# Patient Record
(demographics unavailable — no encounter records)

---

## 2025-01-18 NOTE — DISCUSSION/SUMMARY
[School] : school [Development and Mental Health] : development and mental health [Nutrition and Physical Activity] : nutrition and physical activity [Oral Health] : oral health [Safety] : safety [No Medications] : ~He/She~ is not on any medications [Mother] : mother [Full Activity without restrictions including Physical Education & Athletics] : Full Activity without restrictions including Physical Education & Athletics [I have examined the above-named student and completed the preparticipation physical evaluation. The athlete does not present apparent clinical contraindications to practice and participate in sport(s) as outlined above. A copy of the physical exam is on r] : I have examined the above-named student and completed the preparticipation physical evaluation. The athlete does not present apparent clinical contraindications to practice and participate in sport(s) as outlined above. A copy of the physical exam is on record in my office and can be made available to the school at the request of the parents. If conditions arise after the athlete has been cleared for participation, the physician may rescind the clearance until the problem is resolved and the potential consequences are completely explained to the athlete (and parents/guardians). [FreeTextEntry1] :  Assessment and Plan:   - **Frequent Nose Bleeds:** The patient has been having frequent nosebleeds historically, however, this seems to be improving. Plan to manage with AYR saline gel, and if situation doesn't improve, may possibly need to visit ENT for cauterization.     - Prescribing AYR Saline Gel      - Possible follow-up with ENT if condition persists    - **Severe Eczema:** Mimi's severe eczema is being effectively managed with the help of Dupixent.     - Continuation of Dupixent for managing severe eczema    - **Mild Asthma:** The patient has mild asthma which doesn't seem to cause much issue.     - Monitor the condition      - Use inhalers if necessary    - **Immunizations:** The patient received her flu vaccine and is up to date on other vaccines. Tdap and blood work will be required next year.     - Scheduling for Tdap and blood work for next year    Continue balanced diet with all food groups. Brush teeth twice a day with toothbrush. Recommend visit to dentist. Help child to maintain consistent daily routines and sleep schedule. School discussed. Ensure home is safe. Teach child about personal safety. Use consistent, positive discipline. Limit screen time to no more than 2 hours per day. Encourage physical activity. Child needs to ride in a belt-positioning booster seat until  4 feet 9 inches has been reached and are between 8 and 12 years of age.   Return 1 year for routine well child check.

## 2025-01-18 NOTE — HISTORY OF PRESENT ILLNESS
[Mother] : mother [Eggs] : eggs [Eats meals with family] : eats meals with family [Normal] : Normal [Brushing teeth twice/d] : brushing teeth twice per day [Yes] : Patient goes to dentist yearly [Toothpaste] : Primary Fluoride Source: Toothpaste [Playtime (60 min/d)] : playtime 60 min a day [Participates in after-school activities] : participates in after-school activities [< 2 hrs of screen time per day] : less than 2 hrs of screen time per day [Has Friends] : has friends [Grade ___] : Grade [unfilled] [No difficulties with Homework] : no difficulties with homework [No] : No cigarette smoke exposure [Appropriately restrained in motor vehicle] : appropriately restrained in motor vehicle [Supervised outdoor play] : supervised outdoor play [Supervised around water] : supervised around water [Wears helmet and pads] : wears helmet and pads [Parent knows child's friends] : parent knows child's friends [Parent discusses safety rules regarding adults] : parent discusses safety rules regarding adults [Family discusses home emergency plan] : family discusses home emergency plan [Monitored computer use] : monitored computer use [Exposure to alcohol] : no exposure to alcohol [Exposure to tobacco] : no exposure to tobacco [Exposure to electronic nicotine delivery system] : No exposure to electronic nicotine delivery system [Exposure to illicit drugs] : no exposure to illicit drugs [FreeTextEntry1] : Mimi has a history of severe eczema which has been well controlled with a medication, dupixent, and she has mild asthma. She has had repeated bloody noses, which have been happening daily. However, the frequency of nosebleeds is decreasing.

## 2025-04-18 NOTE — CONSULT LETTER
[Dear  ___] : Dear  [unfilled], [Courtesy Letter:] : I had the pleasure of seeing your patient, [unfilled], in my office today. [Please see my note below.] : Please see my note below. [Consult Closing:] : Thank you very much for allowing me to participate in the care of this patient.  If you have any questions, please do not hesitate to contact me. [Sincerely,] : Sincerely, [FreeTextEntry3] : Brit Avelar NP-BC Certified Family Nurse Practitioner Pediatric Neurology [facility]

## 2025-04-18 NOTE — PHYSICAL EXAM
[Well-appearing] : well-appearing [Normocephalic] : normocephalic [No dysmorphic facial features] : no dysmorphic facial features [No ocular abnormalities] : no ocular abnormalities [Neck supple] : neck supple [No deformities] : no deformities [Alert] : alert [Well related, good eye contact] : well related, good eye contact [Conversant] : conversant [Normal speech and language] : normal speech and language [Follows instructions well] : follows instructions well [No facial asymmetry or weakness] : no facial asymmetry or weakness [Gets up on table without difficulty] : gets up on table without difficulty [Good walking balance] : good walking balance [Normal gait] : normal gait

## 2025-04-18 NOTE — ASSESSMENT
[FreeTextEntry1] : Mimi is 10 y/o girl seen today for an initial visit for inattention and behavioral concerns of anxiety and OCD currently on Zoloft.  At home she is fidgety, unable to follow multi-step instructions and unable to control emotions. At school, teachers have not reported any symptoms. [place] forms given for parent and teacher to complete. ADHD evaluation is ongoing,

## 2025-04-18 NOTE — PLAN
[FreeTextEntry1] : - [place] questionnaires given to mother for parent and teacher - refer to in office psych NP for concerns of anxiety, OCD and depression  -  Discussed use of Omega 3 fish oil - Discussed use of medications as well as side effects if accommodations do not improve school performance - Follow up 1 month to review [place] questionnaires

## 2025-04-18 NOTE — CONSULT LETTER
[Dear  ___] : Dear  [unfilled], [Courtesy Letter:] : I had the pleasure of seeing your patient, [unfilled], in my office today. [Please see my note below.] : Please see my note below. [Consult Closing:] : Thank you very much for allowing me to participate in the care of this patient.  If you have any questions, please do not hesitate to contact me. [Sincerely,] : Sincerely, [FreeTextEntry3] : Brit Avelar NP-BC Certified Family Nurse Practitioner Pediatric Neurology [facility]

## 2025-04-18 NOTE — QUALITY MEASURES
[Impairment in more than one setting] : Impairment in more than one setting: Yes [Coexisting conditions] : Coexisting conditions: Yes [Medication choices] : Medication choices: Not Applicable [Side effects of medications] : Side effects of medications: Not Applicable [FreeTextEntry1] : [place] forms given for parent and teacher to complete

## 2025-04-18 NOTE — QUALITY MEASURES
[Impairment in more than one setting] : Impairment in more than one setting: Yes [Coexisting conditions] : Coexisting conditions: Yes [Medication choices] : Medication choices: Not Applicable [Side effects of medications] : Side effects of medications: Not Applicable [FreeTextEntry1] : [place] forms given for parent and teacher to complete

## 2025-04-18 NOTE — PLAN
[FreeTextEntry1] : - [place] questionnaires given to mother for parent and teacher - refer to in office psych NP for concerns of anxiety, OCD and depression  -  Discussed use of Omega 3 fish oil - Discussed use of medications as well as side effects if accommodations do not improve school performance - Follow up 1 month to review [place] questionnaires

## 2025-04-18 NOTE — HISTORY OF PRESENT ILLNESS
[FreeTextEntry1] : Mimi is a 8 y/o girl seen today for an initial visit for inattention and behavioral concerns. Hx of anxiety and OCD currently on zoloft. At home, mom noticed that she was extra fidgety, moving her legs and just unable to stay still. She is unable to sit through a meal without getting up. Parents stated that she noticed that since 2023.Mom thought it was a new school and best friend moving away. She states that she always bored and need to activity doing something. She is seeing therapist for anxiety and OCD once a week virtually. Pt. has a hard time regulating her emotions, she has a lot of tantrums. Parents don't think it is age appropriate and thinks she is craving attention. She is unable to follow multi-step instructions without reminders and patient reports need one step instructions. She is unable to follow routine things like brushing teeth in the morning. She is unable to get homework done in a timely manner, gets frustrated easily. She is unorganized.    At school, teachers haven't mentioned anything. she does well academically.     Educational assessment: Inattention and behavioral concerns  Current Grade: 4th grade [place]: [place] [address] [place] [address]  General ED/Any Accommodations/ICT in place: In a regular class with 22 students to one teacher at a time. No school accommodations    Hyperactivity: extremely fidgety   Impulsivity:  Can be aggressive with parents when she gets upset. Interrupts when others are speaking, has a hard time waiting   Social Concerns: Denies any social concerns  Sleep: sleeps well, goes to sleep at 830p and wakes up 645am- 7am sleeps the night, gives Melatonin 0.5mg Eating: eats a varied diet Play: gymnastics she is able to focus and does an art class    Family hx of developmental delays/ADD/ADHD: Denies  Other coexisting behaviors? -Mood disorder/depression: Denies  -Anxiety: Mother      Co- morbidities: Diagnosed with anxiety, OCD, no concerns of depression   Other health concerns: Denies staring, twitching, seizure or seizure-like activity. No serious head injury, meningoencephalitis.

## 2025-04-18 NOTE — REASON FOR VISIT
[Initial Consultation] : an initial consultation for [Mother] : mother [Patient] : patient [Parents] : parents

## 2025-04-18 NOTE — HISTORY OF PRESENT ILLNESS
[FreeTextEntry1] : Mimi is a 8 y/o girl seen today for an initial visit for inattention and behavioral concerns. Hx of anxiety and OCD currently on zoloft. At home, mom noticed that she was extra fidgety, moving her legs and just unable to stay still. She is unable to sit through a meal without getting up. Parents stated that she noticed that since 2023.Mom thought it was a new school and best friend moving away. She states that she always bored and need to activity doing something. She is seeing therapist for anxiety and OCD once a week virtually. Pt. has a hard time regulating her emotions, she has a lot of tantrums. Parents don't think it is age appropriate and thinks she is craving attention. She is unable to follow multi-step instructions without reminders and patient reports need one step instructions. She is unable to follow routine things like brushing teeth in the morning. She is unable to get homework done in a timely manner, gets frustrated easily. She is unorganized.    At school, teachers haven't mentioned anything. she does well academically.     Educational assessment: Inattention and behavioral concerns  Current Grade: 4th grade Current District: [employer] [address] [place] [address]  General ED/Any Accommodations/ICT in place: In a regular class with 22 students to one teacher at a time. No school accommodations    Hyperactivity: extremely fidgety   Impulsivity:  Can be aggressive with parents when she gets upset. Interrupts when others are speaking, has a hard time waiting   Social Concerns: Denies any social concerns  Sleep: sleeps well, goes to sleep at 830p and wakes up 645am- 7am sleeps the night, gives Melatonin 0.5mg Eating: eats a varied diet Play: gymnastics she is able to focus and does an art class    Family hx of developmental delays/ADD/ADHD: Denies  Other coexisting behaviors? -Mood disorder/depression: Denies  -Anxiety: Mother      Co- morbidities: Diagnosed with anxiety, OCD, no concerns of depression   Other health concerns: Denies staring, twitching, seizure or seizure-like activity. No serious head injury, meningoencephalitis.

## 2025-04-18 NOTE — ASSESSMENT
[FreeTextEntry1] : Mimi is 8 y/o girl seen today for an initial visit for inattention and behavioral concerns of anxiety and OCD currently on Zoloft.  At home she is fidgety, unable to follow multi-step instructions and unable to control emotions. At school, teachers have not reported any symptoms. [place] forms given for parent and teacher to complete. ADHD evaluation is ongoing,